# Patient Record
Sex: FEMALE | Race: OTHER | Employment: OTHER | URBAN - METROPOLITAN AREA
[De-identification: names, ages, dates, MRNs, and addresses within clinical notes are randomized per-mention and may not be internally consistent; named-entity substitution may affect disease eponyms.]

---

## 2018-08-24 ENCOUNTER — OFFICE VISIT (OUTPATIENT)
Dept: FAMILY MEDICINE CLINIC | Age: 28
End: 2018-08-24

## 2018-08-24 VITALS
HEIGHT: 64 IN | TEMPERATURE: 98 F | DIASTOLIC BLOOD PRESSURE: 60 MMHG | RESPIRATION RATE: 18 BRPM | HEART RATE: 84 BPM | OXYGEN SATURATION: 98 % | SYSTOLIC BLOOD PRESSURE: 111 MMHG | WEIGHT: 157.4 LBS | BODY MASS INDEX: 26.87 KG/M2

## 2018-08-24 DIAGNOSIS — G89.29 CHRONIC PAIN OF RIGHT KNEE: ICD-10-CM

## 2018-08-24 DIAGNOSIS — Z33.1 INCIDENTAL PREGNANCY: ICD-10-CM

## 2018-08-24 DIAGNOSIS — F32.A DEPRESSION AFFECTING PREGNANCY: ICD-10-CM

## 2018-08-24 DIAGNOSIS — M54.2 NECK PAIN, CHRONIC: ICD-10-CM

## 2018-08-24 DIAGNOSIS — F41.9 ANXIETY: ICD-10-CM

## 2018-08-24 DIAGNOSIS — M54.50 CHRONIC BILATERAL LOW BACK PAIN WITHOUT SCIATICA: Primary | ICD-10-CM

## 2018-08-24 DIAGNOSIS — G89.29 CHRONIC BILATERAL LOW BACK PAIN WITHOUT SCIATICA: Primary | ICD-10-CM

## 2018-08-24 DIAGNOSIS — G40.909 SEIZURE DISORDER (HCC): ICD-10-CM

## 2018-08-24 DIAGNOSIS — O99.340 DEPRESSION AFFECTING PREGNANCY: ICD-10-CM

## 2018-08-24 DIAGNOSIS — M25.561 CHRONIC PAIN OF RIGHT KNEE: ICD-10-CM

## 2018-08-24 DIAGNOSIS — G89.29 NECK PAIN, CHRONIC: ICD-10-CM

## 2018-08-24 RX ORDER — FOLIC ACID 1 MG/1
TABLET ORAL
Refills: 11 | COMMUNITY
Start: 2018-07-31

## 2018-08-24 RX ORDER — LEVETIRACETAM 500 MG/1
TABLET ORAL
Refills: 11 | COMMUNITY
Start: 2018-07-31 | End: 2019-04-04

## 2018-08-24 RX ORDER — ASCORBIC ACID, CHOLECALCIFEROL, .ALPHA.-TOCOPHEROL ACETATE, DL-, PYRIDOXINE, FOLIC ACID, CYANOCOBALAMIN, CALCIUM, FERROUS FUMARATE, MAGNESIUM, DOCONEXENT 85; 200; 10; 25; 1; 12; 140; 27; 45; 300 [IU]/1; [IU]/1; [IU]/1; [IU]/1; MG/1; UG/1; MG/1; MG/1; MG/1; MG/1
CAPSULE, GELATIN COATED ORAL
Refills: 3 | COMMUNITY
Start: 2018-08-05

## 2018-08-24 NOTE — PROGRESS NOTES
Chief Complaint   Patient presents with    New Patient     to [facility] , no pcp in few years. has OB physician , Dr. Vania Odonnell ( 24 weeks pregnant )    Back Pain     c/o lower back pain , with sciatica x 3 months       Reviewed Record in preparation for visit and have obtained necessary documentation. Identified pt with two pt identifiers (Name @ )    Health Maintenance Due   Topic    DTaP/Tdap/Td series (1 - Tdap)    PAP AKA CERVICAL CYTOLOGY     Influenza Age 5 to Adult          1. Have you been to the ER, urgent care clinic since your last visit? Hospitalized since your last visit? No    2. Have you seen or consulted any other health care providers outside of the [address] [place] [address] since your last visit? Include any pap smears or colon screening. Lacey Stafford (ob ) for pregnancy.

## 2018-08-24 NOTE — PATIENT INSTRUCTIONS
[facility], [facility]  [address], [address]   [facility]   [address]   [place], [address]  Phone [phone]  Fax [phone]  [facility] [place]    Dr. Cayden Denton, [address] counselor, substance abuse counselor  Stephanie., [facility], [address]  Phone: [phone]    Cong Bryant [facility]  Clinical Counseling and Consulting of [address] [facility] all ages, problems  [address], [address] [facility], [address] [facility]  Phone: [phone]  Fax: [phone]  Specializes in chronic pain patients  Lanny Maradiaga  [address] [facility]   [address] [facility] 83,8Th [address]  [address]  Phone:  [phone]     Son Camargo at [facility] [address] [facility] 99, [facility], 40 [facility]  Phone: [phone]    [facility]  Jessicalissett Winklernathalie and [address] [facility]   (adolescents, adults, families for all problems)  [address] [facility],5Th Floor, 29 [facility]  [place], [facility]  [facility]  Phone: [phone]    Saul Santos, Ph.D.  [facility] Office  Phone: [phone]  FAX: [phone]  [address] [facility] 23    [facility]  Phone: [phone]  FAX: [phone]  201 [facility], [address]  [address]  [phone]  FAX: [phone] [address] [facility] [address], [address]    [facility]  [phone]  FAX: 800 [facility], 295 [facility], [address] [place], [place] [address]way [address] Tran 40 [facility], [address]  [phone]    [facility] [place] [facility] [place] Zepeda, Ph.D  Tj Rocha [address] [facility], [address] [facility] [address]   [phone]    The [address] [facility] Office  [address] [facility], [facility] [address]  [phone]    [facility] Office  [address] [place] [address] 238 [facility], [address] [place], [address] [facility] 7833, [place], [address]   [phone]    [facility])  [phone]    [facility] [address] Location  [place] [address]    [address] [facility] and [facility] RUSTYWALADAN [place] [address], 2601 [facility], [address]  [phone]    [facility] 96  [facility]  [place] [address], [address]  [phone]               Recovering From Depression: Care Instructions  Your Care Instructions    Taking good care of yourself is important as you recover from depression. In time, your symptoms will fade as your treatment takes hold. Do not give up. Instead, focus your energy on getting better. Your mood will improve. It just takes some time. Focus on things that can help you feel better, such as being with friends and family, eating well, and getting enough rest. But take things slowly. Do not do too much too soon. You will begin to feel better gradually. Follow-up care is a key part of your treatment and safety. Be sure to make and go to all appointments, and call your doctor if you are having problems. It's also a good idea to know your test results and keep a list of the medicines you take. How can you care for yourself at home? Be realistic  · If you have a large task to do, break it up into smaller steps you can handle, and just do what you can. · You may want to put off important decisions until your depression has lifted. If you have plans that will have a major impact on your life, such as marriage, divorce, or a job change, try to wait a bit. Talk it over with friends and loved ones who can help you look at the overall picture first.  · Reaching out to people for help is important. Do not isolate yourself.  Let your family and friends help you. Find someone you can trust and confide in, and talk to that person. · Be patient, and be kind to yourself. Remember that depression is not your fault and is not something you can overcome with willpower alone. Treatment is necessary for depression, just like for any other illness. Feeling better takes time, and your mood will improve little by little. Stay active  · Stay busy and get outside. Take a walk, or try some other light exercise. · Talk with your doctor about an exercise program. Exercise can help with mild depression. · Go to a movie or concert. Take part in a [employer] activity or other social gathering. Go to a [employer] game. · Ask a friend to have dinner with you. Take care of yourself  · Eat a balanced diet with plenty of fresh fruits and vegetables, whole grains, and lean protein. If you have lost your appetite, eat small snacks rather than large meals. · Avoid drinking alcohol or using illegal drugs. Do not take medicines that have not been prescribed for you. They may interfere with medicines you may be taking for depression, or they may make your depression worse. · Take your medicines exactly as they are prescribed. You may start to feel better within 1 to 3 weeks of taking antidepressant medicine. But it can take as many as 6 to 8 weeks to see more improvement. If you have questions or concerns about your medicines, or if you do not notice any improvement by 3 weeks, talk to your doctor. · If you have any side effects from your medicine, tell your doctor. Antidepressants can make you feel tired, dizzy, or nervous. Some people have dry mouth, constipation, headaches, sexual problems, or diarrhea. Many of these side effects are mild and will go away on their own after you have been taking the medicine for a few weeks. Some may last longer. Talk to your doctor if side effects are bothering you too much. You might be able to try a different medicine.   · Get enough sleep. If you have problems sleeping:  ¨ Go to bed at the same time every night, and get up at the same time every morning. ¨ Keep your bedroom dark and quiet. ¨ Do not exercise after 5:00 p.m. ¨ Avoid drinks with caffeine after 5:00 p.m. · Avoid sleeping pills unless they are prescribed by the doctor treating your depression. Sleeping pills may make you groggy during the day, and they may interact with other medicine you are taking. · If you have any other illnesses, such as diabetes, heart disease, or high blood pressure, make sure to continue with your treatment. Tell your doctor about all of the medicines you take, including those with or without a prescription. · Keep the numbers for these national suicide hotlines: 2-529-170-TALK (0-890.791.7945) and 4-143-RMXBFDP (2-762.126.3978). If you or someone you know talks about suicide or feeling hopeless, get help right away. When should you call for help? Call 911 anytime you think you may need emergency care. For example, call if:    · You feel like hurting yourself or someone else.     · Someone you know has depression and is about to attempt or is attempting suicide.   [facility] your doctor now or seek immediate medical care if:    · You hear voices.     · Someone you know has depression and:  ¨ Starts to give away his or her possessions. ¨ Uses illegal drugs or drinks alcohol heavily. ¨ Talks or writes about death, including writing suicide notes or talking about guns, knives, or pills. ¨ Starts to spend a lot of time alone. ¨ Acts very aggressively or suddenly appears calm.    Watch closely for changes in your health, and be sure to contact your doctor if:    · You do not get better as expected. Where can you learn more? Go to http://wendy-socrates.info/. Enter H117 in the search box to learn more about \"Recovering From Depression: Care Instructions. \"  Current as of: December 7, 2017  Content Version: 11.7  © 4047-5788 Healthwise, Incorporated. Care instructions adapted under license by [employer] (which disclaims liability or warranty for this information). If you have questions about a medical condition or this instruction, always ask your healthcare professional. [address] any warranty or liability for your use of this information.

## 2018-10-26 ENCOUNTER — TELEPHONE (OUTPATIENT)
Dept: FAMILY MEDICINE CLINIC | Age: 28
End: 2018-10-26

## 2018-10-26 ENCOUNTER — OFFICE VISIT (OUTPATIENT)
Dept: FAMILY MEDICINE CLINIC | Age: 28
End: 2018-10-26

## 2018-10-26 VITALS
DIASTOLIC BLOOD PRESSURE: 60 MMHG | OXYGEN SATURATION: 98 % | SYSTOLIC BLOOD PRESSURE: 110 MMHG | BODY MASS INDEX: 29.81 KG/M2 | WEIGHT: 174.6 LBS | RESPIRATION RATE: 16 BRPM | TEMPERATURE: 97.9 F | HEART RATE: 86 BPM | HEIGHT: 64 IN

## 2018-10-26 DIAGNOSIS — R09.82 POSTNASAL DRIP: ICD-10-CM

## 2018-10-26 DIAGNOSIS — H04.123 DRY EYES: ICD-10-CM

## 2018-10-26 DIAGNOSIS — G40.909 SEIZURE DISORDER (HCC): ICD-10-CM

## 2018-10-26 DIAGNOSIS — F41.9 ANXIETY: ICD-10-CM

## 2018-10-26 DIAGNOSIS — G89.29 CHRONIC BILATERAL LOW BACK PAIN WITHOUT SCIATICA: ICD-10-CM

## 2018-10-26 DIAGNOSIS — M54.50 CHRONIC BILATERAL LOW BACK PAIN WITHOUT SCIATICA: ICD-10-CM

## 2018-10-26 DIAGNOSIS — L30.9 NIPPLE DERMATITIS: Primary | ICD-10-CM

## 2018-10-26 RX ORDER — LORATADINE 10 MG/1
10 TABLET ORAL
Qty: 30 TAB | Refills: 6 | Status: SHIPPED | OUTPATIENT
Start: 2018-10-26

## 2018-10-26 RX ORDER — POLYVINYL ALCOHOL 14 MG/ML
1 SOLUTION/ DROPS OPHTHALMIC
Qty: 15 ML | Refills: 3 | Status: SHIPPED | OUTPATIENT
Start: 2018-10-26

## 2018-10-26 NOTE — TELEPHONE ENCOUNTER
white pet-mineral oil-lanolin (AKWA TEARS) ophthalmic ointment [[id number]]     Pharmacy is calling to let Dr Concha Carcamo know that they don't have the medication above, if she can change it to something else.   Best call back : [phone]

## 2018-10-26 NOTE — PROGRESS NOTES
Chief Complaint   Patient presents with    Back Pain     follow up back and neck pain ,states went to therapy and some better    Other     feels like something in throat, has to clear throat alot       Reviewed Record in preparation for visit and have obtained necessary documentation. Identified pt with two pt identifiers (Name @ )    Health Maintenance Due   Topic    DTaP/Tdap/Td series (1 - Tdap)    PAP AKA CERVICAL CYTOLOGY     Influenza Age 5 to Adult          1. Have you been to the ER, urgent care clinic since your last visit? Hospitalized since your last visit? No    2. Have you seen or consulted any other health care providers outside of the [address] [place] [address] since your last visit? Include any pap smears or colon screening. Saw ob/gyn this past Tuesday .

## 2018-10-26 NOTE — PATIENT INSTRUCTIONS
Use aquaphor or vaseline on nipples and hands after bathing to lock in the moisture  If the vaseline and aquaphor don't help you can use hydrocortisone cream on your nipples (but not when nursing)         Dermatitis: Care Instructions  Your Care Instructions  Dermatitis is the general name used for any rash or inflammation of the skin. Different kinds of dermatitis cause different kinds of rashes. Common causes of a rash include new medicines, plants (such as poison oak or poison ivy), heat, and stress. Certain illnesses can also cause a rash. An allergic reaction to something that touches your skin, such as latex, nickel, or poison ivy, is called contact dermatitis. Contact dermatitis may also be caused by something that irritates the skin, such as bleach, a chemical, or soap. These types of rashes cannot be spread from person to person. How long your rash will last depends on what caused it. Rashes may last a few days or months. Follow-up care is a key part of your treatment and safety. Be sure to make and go to all appointments, and call your doctor if you are having problems. It's also a good idea to know your test results and keep a list of the medicines you take. How can you care for yourself at home? · Do not scratch the rash. Cut your nails short, and file them smooth. Or wear gloves if this helps keep you from scratching. · Wash the area with water only. Pat dry. · Put cold, wet cloths on the rash to reduce itching. · Keep cool, and stay out of the sun. · Leave the rash open to the air as much as possible. · If the rash itches, use hydrocortisone cream. Follow the directions on the label. Calamine lotion may help for plant rashes. · Take an over-the-counter antihistamine, such as diphenhydramine (Benadryl) or loratadine (Claritin), to help calm the itching. Read and follow all instructions on the label. · If your doctor prescribed a cream, use it as directed.  If your doctor prescribed medicine, take it exactly as directed. When should you call for help? Call your doctor now or seek immediate medical care if:    · You have symptoms of infection, such as:  ? Increased pain, swelling, warmth, or redness. ? Red streaks leading from the area. ? Pus draining from the area. ? A fever.     · You have joint pain along with the rash.    Watch closely for changes in your health, and be sure to contact your doctor if:    · Your rash is changing or getting worse.     · You are not getting better as expected. Where can you learn more? Go to http://wendy-socrates.info/. Enter [phone] in the search box to learn more about \"Dermatitis: Care Instructions. \"  Current as of: April 18, 2018  Content Version: 11.8  © 3063-3189 Healthwise, Incorporated. Care instructions adapted under license by [employer] (which disclaims liability or warranty for this information). If you have questions about a medical condition or this instruction, always ask your healthcare professional. [place] [address] any warranty or liability for your use of this information.

## 2018-10-26 NOTE — PROGRESS NOTES
Daniel Awad [facility]  [address] [place], [address]  [phone]             Date of visit: 10/26/2018   Subjective:      History obtained from:  the patient. Shira Floyd is a 33 y.o. female who presents today for f/u chronic problems and throat congestion  Even before pregnant always had phlegm in her throat  Lately getting worse  Always clearing throat  Doesn't feel sick  No runny nose  Eyes are dry, feel irritated at night, not itchy  Sometimes has acid reflux mainly dependent on what she eats, about 3x per week  Pregnant due 12/16/18  Not getting enough sleep, baby moves a lot    Still on keppra, planning to breastfeed  Will still stay on keppra long-term    Right nipple looking different from left  Has some moles growing since she has been pregnant  Her nipples are red and dry feeling    For her belly using tummy butter    Has rosacea on face and is careful    Got flu shot and tdap 10/9      Did 9 sessions PT and reall helped her upper/lower back pain  Continues to do the exercises at home when having pain    Not having painful contractions  No bleeding  No loss of fluid  Feeling baby move    Her mood is much better after doing 2 counseling sessions    Went to [place] last week, flew, got swollen in legs, but gone now  Patient Active Problem List    Diagnosis Date Noted    Seizure disorder ([facility] [address] 08/24/2018    Neck pain, chronic 08/24/2018    Chronic pain of right knee 08/24/2018    Chronic bilateral low back pain without sciatica 08/24/2018    Depression affecting pregnancy 08/24/2018    Anxiety 08/24/2018     Current Outpatient Medications   Medication Sig Dispense Refill    loratadine (CLARITIN) 10 mg tablet Take 1 Tab by mouth daily as needed for Allergies. 30 Tab 6    white pet-mineral oil-lanolin (AKWA TEARS) ophthalmic ointment Administer  to both eyes nightly.  3.5 g 6    PNV-DHA 27 mg iron-1 mg -300 mg cap TAKE 1 TABLET BY MOUTH DAILY  3    folic acid (FOLVITE) 1 mg tablet TAKE 1 TABLET BY MOUTH EVERY DAY  11    levETIRAcetam (KEPPRA) 500 mg tablet TAKE 1 TABLET BY MOUTH TWICE A DAY  11    ibuprofen (MOTRIN) 600 mg tablet Take 1 Tab by mouth every six (6) hours as needed for Pain. 30 Tab 0     No Known Allergies  Past Medical History:   Diagnosis Date    Seizures ([address]      Past Surgical History:   Procedure Laterality Date    HX ORTHOPAEDIC       Family History   Problem Relation Age of Onset    Thyroid Disease Mother     Heart Disease Father     Hypertension Father     Thyroid Disease Sister      Social History     Tobacco Use    Smoking status: Never Smoker    Smokeless tobacco: Never Used   Substance Use Topics    Alcohol use: No      Social History     Social History Narrative    Originally from 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      General: stated age, well nourished, and in NAD  Eyes: no redness or drainage, appear normal, PERRL  Neck: supple, symmetrical, trachea midline, no adenopathy and thyroid: not enlarged, symmetric, no tenderness/mass/nodules  Ears: TMs clear bilaterally, canals patent without inflammation  Nose: clear drainage, turbinates a little pale  Throat: clear, no tonsillar exudate or erthema  Mouth: no lesions noted  Lungs:  clear to auscultation w/o rales, rhonchi, wheezes w/normal effort and no use of accessory muscles of respiration   Heart: regular rate and rhythm, S1, S2 normal, no murmur, click, rub or gallop  Abd: gravid  Lymph: no cervical adenopathy appreciated  Ext: no edema  Skin:  bilateral aerolas erythematous and a bit scaly, several small 2-5mm slightly raised brown uniform nevi on both nipples      Assessment/Plan:       ICD-10-CM ICD-9-CM    1. Nipple dermatitis L30.9 692.9    2. Postnasal drip R09.82 784.91    3. Seizure disorder ([facility] [address] G40.909 345.90    4. Chronic bilateral low back pain without sciatica M54.5 724.2     G89.29 338.29    5. Anxiety F41.9 300.00    6. Dry eyes H04. 123 375.15         Orders Placed This Encounter    loratadine (CLARITIN) 10 mg tablet    white pet-mineral oil-lanolin (AKWA TEARS) ophthalmic ointment       Advised vaseline or aquaphor for nipples, hydrocortisone if that doesn't work (until she is nursing)  Discussed breastfeeding, taking class, also lamaze  Try claritin for PND, update me in a week  Could be related to her reflux as well, but she doesn't feel like that is frequent  I am relieved her mood is doing better, tolerating the keppra better  The 2 counseling sessions helped  Her back pain is much better after a round of PT and will do the exercises at home when needed  Ointment for dry eyes that happen at night. Discussed the diagnosis and plan and she expressed understanding. Follow-up Disposition:  Return if symptoms worsen or fail to improve.     Cristino Solomon MD

## 2019-04-04 ENCOUNTER — HOSPITAL ENCOUNTER (EMERGENCY)
Age: 29
Discharge: HOME | End: 2019-04-04
Attending: EMERGENCY MEDICINE
Payer: SELF-PAY

## 2019-04-04 VITALS
BODY MASS INDEX: 29.92 KG/M2 | TEMPERATURE: 98.8 F | HEIGHT: 64 IN | SYSTOLIC BLOOD PRESSURE: 110 MMHG | RESPIRATION RATE: 11 BRPM | HEART RATE: 78 BPM | WEIGHT: 175.27 LBS | DIASTOLIC BLOOD PRESSURE: 75 MMHG | OXYGEN SATURATION: 97 %

## 2019-04-04 DIAGNOSIS — R56.9 SEIZURE (HCC): Primary | ICD-10-CM

## 2019-04-04 LAB
ALBUMIN SERPL-MCNC: 3.9 G/DL (ref 3.5–5)
ALBUMIN/GLOB SERPL: 1.1 {RATIO} (ref 1.1–2.2)
ALP SERPL-CCNC: 96 U/L (ref 45–117)
ALT SERPL-CCNC: 99 U/L (ref 12–78)
ANION GAP SERPL CALC-SCNC: 14 MMOL/L (ref 5–15)
APPEARANCE UR: CLEAR
AST SERPL-CCNC: 35 U/L (ref 15–37)
ATRIAL RATE: 99 BPM
BACTERIA URNS QL MICRO: NEGATIVE /HPF
BASOPHILS # BLD: 0 K/UL (ref 0–0.1)
BASOPHILS NFR BLD: 0 % (ref 0–1)
BILIRUB SERPL-MCNC: 0.3 MG/DL (ref 0.2–1)
BILIRUB UR QL: NEGATIVE
BUN SERPL-MCNC: 18 MG/DL (ref 6–20)
BUN/CREAT SERPL: 16 (ref 12–20)
CALCIUM SERPL-MCNC: 9.1 MG/DL (ref 8.5–10.1)
CALCULATED P AXIS, ECG09: 59 DEGREES
CALCULATED R AXIS, ECG10: 72 DEGREES
CALCULATED T AXIS, ECG11: 57 DEGREES
CHLORIDE SERPL-SCNC: 102 MMOL/L (ref 97–108)
CO2 SERPL-SCNC: 23 MMOL/L (ref 21–32)
COLOR UR: ABNORMAL
COMMENT, HOLDF: NORMAL
CREAT SERPL-MCNC: 1.12 MG/DL (ref 0.55–1.02)
DIAGNOSIS, 93000: NORMAL
DIFFERENTIAL METHOD BLD: NORMAL
EOSINOPHIL # BLD: 0 K/UL (ref 0–0.4)
EOSINOPHIL NFR BLD: 1 % (ref 0–7)
EPITH CASTS URNS QL MICRO: ABNORMAL /LPF
ERYTHROCYTE [DISTWIDTH] IN BLOOD BY AUTOMATED COUNT: 12.8 % (ref 11.5–14.5)
GLOBULIN SER CALC-MCNC: 3.7 G/DL (ref 2–4)
GLUCOSE SERPL-MCNC: 75 MG/DL (ref 65–100)
GLUCOSE UR STRIP.AUTO-MCNC: NEGATIVE MG/DL
HCT VFR BLD AUTO: 40.4 % (ref 35–47)
HGB BLD-MCNC: 13.4 G/DL (ref 11.5–16)
HGB UR QL STRIP: ABNORMAL
IMM GRANULOCYTES # BLD AUTO: 0 K/UL (ref 0–0.04)
IMM GRANULOCYTES NFR BLD AUTO: 0 % (ref 0–0.5)
KETONES UR QL STRIP.AUTO: NEGATIVE MG/DL
LEUKOCYTE ESTERASE UR QL STRIP.AUTO: ABNORMAL
LYMPHOCYTES # BLD: 2.4 K/UL (ref 0.8–3.5)
LYMPHOCYTES NFR BLD: 47 % (ref 12–49)
MAGNESIUM SERPL-MCNC: 2.1 MG/DL (ref 1.6–2.4)
MCH RBC QN AUTO: 29.6 PG (ref 26–34)
MCHC RBC AUTO-ENTMCNC: 33.2 G/DL (ref 30–36.5)
MCV RBC AUTO: 89.2 FL (ref 80–99)
MONOCYTES # BLD: 0.4 K/UL (ref 0–1)
MONOCYTES NFR BLD: 7 % (ref 5–13)
NEUTS SEG # BLD: 2.3 K/UL (ref 1.8–8)
NEUTS SEG NFR BLD: 45 % (ref 32–75)
NITRITE UR QL STRIP.AUTO: NEGATIVE
NRBC # BLD: 0 K/UL (ref 0–0.01)
NRBC BLD-RTO: 0 PER 100 WBC
P-R INTERVAL, ECG05: 154 MS
PH UR STRIP: 6 [PH] (ref 5–8)
PLATELET # BLD AUTO: 238 K/UL (ref 150–400)
PMV BLD AUTO: 10.7 FL (ref 8.9–12.9)
POTASSIUM SERPL-SCNC: 3.8 MMOL/L (ref 3.5–5.1)
PROT SERPL-MCNC: 7.6 G/DL (ref 6.4–8.2)
PROT UR STRIP-MCNC: 100 MG/DL
Q-T INTERVAL, ECG07: 366 MS
QRS DURATION, ECG06: 88 MS
QTC CALCULATION (BEZET), ECG08: 469 MS
RBC # BLD AUTO: 4.53 M/UL (ref 3.8–5.2)
RBC #/AREA URNS HPF: >100 /HPF (ref 0–5)
SAMPLES BEING HELD,HOLD: NORMAL
SODIUM SERPL-SCNC: 139 MMOL/L (ref 136–145)
SP GR UR REFRACTOMETRY: 1.02 (ref 1–1.03)
UROBILINOGEN UR QL STRIP.AUTO: 0.2 EU/DL (ref 0.2–1)
VENTRICULAR RATE, ECG03: 99 BPM
WBC # BLD AUTO: 5.3 K/UL (ref 3.6–11)
WBC URNS QL MICRO: ABNORMAL /HPF (ref 0–4)

## 2019-04-04 PROCEDURE — 81001 URINALYSIS AUTO W/SCOPE: CPT

## 2019-04-04 PROCEDURE — 85025 COMPLETE CBC W/AUTO DIFF WBC: CPT

## 2019-04-04 PROCEDURE — 93005 ELECTROCARDIOGRAM TRACING: CPT

## 2019-04-04 PROCEDURE — 99284 EMERGENCY DEPT VISIT MOD MDM: CPT

## 2019-04-04 PROCEDURE — 80053 COMPREHEN METABOLIC PANEL: CPT

## 2019-04-04 PROCEDURE — 36415 COLL VENOUS BLD VENIPUNCTURE: CPT

## 2019-04-04 PROCEDURE — 83735 ASSAY OF MAGNESIUM: CPT

## 2019-04-04 PROCEDURE — 74011250637 HC RX REV CODE- 250/637: Performed by: EMERGENCY MEDICINE

## 2019-04-04 PROCEDURE — 96361 HYDRATE IV INFUSION ADD-ON: CPT

## 2019-04-04 PROCEDURE — 96374 THER/PROPH/DIAG INJ IV PUSH: CPT

## 2019-04-04 PROCEDURE — 74011250636 HC RX REV CODE- 250/636: Performed by: EMERGENCY MEDICINE

## 2019-04-04 RX ORDER — ONDANSETRON 2 MG/ML
4 INJECTION INTRAMUSCULAR; INTRAVENOUS
Status: COMPLETED | OUTPATIENT
Start: 2019-04-04 | End: 2019-04-04

## 2019-04-04 RX ORDER — LAMOTRIGINE 100 MG/1
50 TABLET ORAL
Status: COMPLETED | OUTPATIENT
Start: 2019-04-04 | End: 2019-04-04

## 2019-04-04 RX ORDER — LAMOTRIGINE 100 MG/1
TABLET ORAL
Qty: 30 TAB | Refills: 0 | Status: SHIPPED | OUTPATIENT
Start: 2019-04-04 | End: 2019-04-04

## 2019-04-04 RX ORDER — LAMOTRIGINE 100 MG/1
100 TABLET ORAL 2 TIMES DAILY
COMMUNITY
End: 2019-04-04

## 2019-04-04 RX ORDER — LAMOTRIGINE 100 MG/1
TABLET ORAL
Qty: 30 TAB | Refills: 0 | Status: SHIPPED | OUTPATIENT
Start: 2019-04-04

## 2019-04-04 RX ADMIN — SODIUM CHLORIDE 1000 ML: 900 INJECTION, SOLUTION INTRAVENOUS at 01:55

## 2019-04-04 RX ADMIN — ONDANSETRON 4 MG: 2 INJECTION INTRAMUSCULAR; INTRAVENOUS at 01:55

## 2019-04-04 RX ADMIN — LAMOTRIGINE 50 MG: 100 TABLET ORAL at 02:51

## 2019-04-04 NOTE — ED PROVIDER NOTES
65-year-old female presenting to the ER status post seizure. Past medical history of seizure disorder, and actually 3 months postpartum. This evening the patient was trying to sleep when she felt \"jumpy\" then awoke into her  stating that she had a seizure. Patient's  states that it was tonoclonic seizure lasting approximately 3 minutes with a postictal period consistent of fusion but able to speak. The patient bit her tongue. No urinary incontinence. Before patient's recent pregnancy she was on valproic acid this was changed to Her urine pregnancy. 4 weeks ago patient's Keppra was weaned down and was started on Lamictal. Currently not taking any Are up is taking Lamictal 100 mg b.i.d. Patient is followed by neurology Dr. Ijeoma Caruso Patient reports having recent URI-like symptoms. No fevers. No UTI-like symptoms Patient reports decreased amount of sleep as a result of having an infant Current having her menstrual cycle. No drug use Patient reports being compliant with medications Past Medical History:  
Diagnosis Date  Seizures ([facility] [address] Past Surgical History:  
Procedure Laterality Date  HX ORTHOPAEDIC Family History:  
Problem Relation Age of Onset  Thyroid Disease Mother  Heart Disease Father  Hypertension Father  Thyroid Disease Sister Social History Socioeconomic History  Marital status: SINGLE Spouse name: Not on file  Number of children: Not on file  Years of education: Not on file  Highest education level: Not on file Occupational History  Not on file Social Needs  Financial resource strain: Not on file  Food insecurity:  
  Worry: Not on file Inability: Not on file  Transportation needs:  
  Medical: Not on file Non-medical: Not on file Tobacco Use  Smoking status: Never Smoker  Smokeless tobacco: Never Used Substance and Sexual Activity  Alcohol use:  Yes  
 Comment: occasionally  Drug use: No  
 Sexual activity: Yes  
  Partners: Male Birth control/protection: None Lifestyle  Physical activity:  
  Days per week: Not on file Minutes per session: Not on file  Stress: Not on file Relationships  Social connections:  
  Talks on phone: Not on file Gets together: Not on file Attends Congregational service: Not on file Active member of club or organization: Not on file Attends meetings of clubs or organizations: Not on file Relationship status: Not on file  Intimate partner violence:  
  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 Review of Systems Constitutional: Positive for activity change (decreased sleep) and fatigue. Negative for chills and fever. HENT: Positive for congestion. Negative for sore throat. Eyes: Negative for photophobia. Respiratory: Negative for shortness of breath. Cardiovascular: Negative for chest pain and leg swelling. Gastrointestinal: Negative for abdominal pain. Genitourinary: Negative for dysuria, flank pain and frequency. Musculoskeletal: Negative for back pain. Neurological: Positive for seizures. Negative for weakness, numbness and headaches. Vitals:  
 04/04/19 0100 04/04/19 0145 BP: (!) 143/108 110/75 Pulse: (!) 110 78 Resp: 21 11 Temp: 98.8 °F (37.1 °C) SpO2: 98% 97% Weight: 79.5 kg (175 lb 4.3 oz) Height: 5' 4\" (1.626 m) Physical Exam  
Constitutional: She is oriented to person, place, and time. She appears well-developed and well-nourished. No distress. HENT:  
Head: Normocephalic. Mouth/Throat: Oropharynx is clear and moist.  
Tongue laceration Eyes: Pupils are equal, round, and reactive to light. Conjunctivae and EOM are normal.  
Neck: Normal range of motion. Neck supple. Cardiovascular: Regular rhythm. Tachycardia present. Pulmonary/Chest: Effort normal and breath sounds normal. No respiratory distress. Abdominal: Soft. Bowel sounds are normal. There is no tenderness. Musculoskeletal: Normal range of motion. She exhibits no edema. Neurological: She is alert and oriented to person, place, and time. She has normal strength. No cranial nerve deficit or sensory deficit. Skin: Skin is warm. Capillary refill takes less than 2 seconds. No rash noted. MDM Number of Diagnoses or Management Options Diagnosis management comments: 70-year-old female history seizure disorder 4 weeks ago at [facility] titrated down and started on Lamictal. Decreased sleep and recent URI. Last seizure one year ago. Had a tonic-clonic seizure followed by postictal period now back at baseline. Patient is postpartum 3 months outside the window for eclampsia. Amount and/or Complexity of Data Reviewed Clinical lab tests: reviewed Tests in the medicine section of CPT®: reviewed ED Course as of Apr 04 0256 Thu Apr 04, 2019  
[id number] Paged pt's neurology several times without answer. Paged BS neurology oncall  
 [ZD] [address]: Vanessa Haines: recommending increasing Lamictal 100mg AM and 150mg PM 
F/U with neurologist  
 [ZD] 1970 Discussed with pt the change in Lamictal dosing. [ZD] ED Course User Index [ZD] Jeffrey Orta MD  
 
 
Procedures Recent Results (from the past 24 hour(s)) CBC WITH AUTOMATED DIFF Collection Time: 04/04/19 12:57 AM  
Result Value Ref Range WBC 5.3 3.6 - 11.0 K/uL  
 RBC 4.53 3.80 - 5.20 M/uL  
 HGB 13.4 11.5 - 16.0 g/dL HCT 40.4 35.0 - 47.0 % MCV 89.2 80.0 - 99.0 FL  
 MCH 29.6 26.0 - 34.0 PG  
 MCHC 33.2 30.0 - 36.5 g/dL RDW 12.8 11.5 - 14.5 % PLATELET 626 263 - 719 K/uL MPV 10.7 8.9 - 12.9 FL  
 NRBC 0.0 0  WBC ABSOLUTE NRBC 0.00 0.00 - 0.01 K/uL NEUTROPHILS 45 32 - 75 % LYMPHOCYTES 47 12 - 49 % MONOCYTES 7 5 - 13 % EOSINOPHILS 1 0 - 7 % BASOPHILS 0 0 - 1 % IMMATURE GRANULOCYTES 0 0.0 - 0.5 % ABS. NEUTROPHILS 2.3 1.8 - 8.0 K/UL  
 ABS. LYMPHOCYTES 2.4 0.8 - 3.5 K/UL  
 ABS. MONOCYTES 0.4 0.0 - 1.0 K/UL  
 ABS. EOSINOPHILS 0.0 0.0 - 0.4 K/UL  
 ABS. BASOPHILS 0.0 0.0 - 0.1 K/UL  
 ABS. IMM. GRANS. 0.0 0.00 - 0.04 K/UL  
 DF AUTOMATED METABOLIC PANEL, COMPREHENSIVE Collection Time: 04/04/19 12:57 AM  
Result Value Ref Range Sodium 139 136 - 145 mmol/L Potassium 3.8 3.5 - 5.1 mmol/L Chloride 102 97 - 108 mmol/L  
 CO2 23 21 - 32 mmol/L Anion gap 14 5 - 15 mmol/L Glucose 75 65 - 100 mg/dL BUN 18 6 - 20 MG/DL Creatinine 1.12 (H) 0.55 - 1.02 MG/DL  
 BUN/Creatinine ratio 16 12 - 20 GFR est AA >60 >60 ml/min/1.73m2 GFR est non-AA 58 (L) >60 ml/min/1.73m2 Calcium 9.1 8.5 - 10.1 MG/DL Bilirubin, total 0.3 0.2 - 1.0 MG/DL  
 ALT (SGPT) 99 (H) 12 - 78 U/L  
 AST (SGOT) 35 15 - 37 U/L Alk. phosphatase 96 45 - 117 U/L Protein, total 7.6 6.4 - 8.2 g/dL Albumin 3.9 3.5 - 5.0 g/dL Globulin 3.7 2.0 - 4.0 g/dL A-G Ratio 1.1 1.1 - 2.2 MAGNESIUM Collection Time: 04/04/19 12:57 AM  
Result Value Ref Range Magnesium 2.1 1.6 - 2.4 mg/dL EKG, 12 LEAD, INITIAL Collection Time: 04/04/19  1:00 AM  
Result Value Ref Range Ventricular Rate 99 BPM  
 Atrial Rate 99 BPM  
 P-R Interval 154 ms QRS Duration 88 ms Q-T Interval 366 ms  
 QTC Calculation (Bezet) 469 ms Calculated P Axis 59 degrees Calculated R Axis 72 degrees Calculated T Axis 57 degrees Diagnosis Normal sinus rhythm Normal ECG No previous ECGs available SAMPLES BEING HELD Collection Time: 04/04/19  1:04 AM  
Result Value Ref Range SAMPLES BEING HELD 1 RED 1 BLUE   
 COMMENT Add-on orders for these samples will be processed based on acceptable specimen integrity and analyte stability, which may vary by analyte. URINALYSIS W/ RFLX MICROSCOPIC Collection Time: 04/04/19  1:54 AM  
Result Value Ref Range Color RED Appearance CLEAR CLEAR Specific gravity 1.025 1.003 - 1.030    
 pH (UA) 6.0 5.0 - 8.0 Protein 100 (A) NEG mg/dL Glucose NEGATIVE  NEG mg/dL Ketone NEGATIVE  NEG mg/dL Bilirubin NEGATIVE  NEG Blood LARGE (A) NEG Urobilinogen 0.2 0.2 - 1.0 EU/dL Nitrites NEGATIVE  NEG Leukocyte Esterase SMALL (A) NEG URINE MICROSCOPIC ONLY Collection Time: 04/04/19  1:54 AM  
Result Value Ref Range WBC 10-20 0 - 4 /hpf  
 RBC >100 (H) 0 - 5 /hpf Epithelial cells FEW FEW /lpf Bacteria NEGATIVE  NEG /hpf No results found. 1:25 AM 
EKG: NSR. Rate 99. Normal intervals, normal axis, no ST-elevations or depressions. No signs of ischemia.

## 2019-04-04 NOTE — ED TRIAGE NOTES
Pt arrived via EMS. EMS reports patient has a history of seizures and recently had her daughter 3 months ago and was taken off of her Keppra this week and started on lamotrigine 4 weeks ago. Pt reports her Neurologist is Rd Camarena MD. Pt reports her last seizure was 1 year ago. EMS states that patient experience approx. 3 minute tonic clonic seizure tonight prior to coming to ED.

## 2019-04-04 NOTE — DISCHARGE INSTRUCTIONS
Patient Education        Seizure: Care Instructions  Your Care Instructions    Seizures are caused by abnormal patterns of electrical signals in the brain. They are different for each person. Seizures can affect movement, speech, vision, or awareness. Some people have only slight shaking of a hand and do not pass out. Other people may pass out and have violent shaking of the whole body. Some people appear to stare into space. They are awake, but they can't respond normally. Later, they may not remember what happened. You may need tests to identify the type and cause of the seizures. A seizure may occur only once, or you may have them more than one time. Taking medicines as directed and following up with your doctor may help keep you from having more seizures. The doctor has checked you carefully, but problems can develop later. If you notice any problems or new symptoms, get medical treatment right away. Follow-up care is a key part of your treatment and safety. Be sure to make and go to all appointments, and call your doctor if you are having problems. It's also a good idea to know your test results and keep a list of the medicines you take. How can you care for yourself at home? · Be safe with medicines. Take your medicines exactly as prescribed. Call your doctor if you think you are having a problem with your medicine. · Do not do any activity that could be dangerous to you or others until your doctor says it is safe to do so. For example, do not drive a car, operate machinery, swim, or climb ladders. · Be sure that anyone treating you for any health problem knows that you have had a seizure and what medicines you are taking for it. · Identify and avoid things that may make you more likely to have a seizure. These may include lack of sleep, alcohol or drug use, stress, or not eating. · Make sure you go to your follow-up appointment. When should you call for help?   Call 911 anytime you think you may need emergency care. For example, call if:    · You have another seizure.     · You have more than one seizure in 24 hours.     · You have new symptoms, such as trouble walking, speaking, or thinking clearly.    Call your doctor now or seek immediate medical care if:    · You are not acting normally.    Watch closely for changes in your health, and be sure to contact your doctor if you have any problems. Where can you learn more? Go to http://wendy-socrates.info/. Enter T797 in the search box to learn more about \"Seizure: Care Instructions. \"  Current as of: Claudia 3, 2018  Content Version: 11.9  © 4287-6969 [employer]. Care instructions adapted under license by [employer] (which disclaims liability or warranty for this information). If you have questions about a medical condition or this instruction, always ask your healthcare professional. [address] any warranty or liability for your use of this information.

## 2024-05-22 NOTE — MR AVS SNAPSHOT
[address] [place] [address] 
[phone] Patient: Blanca Landeros MRN: [id number] :1990 Visit Information Date & Time Provider Department Dept. Phone Encounter #  
 2018  2:00 PM Marizol Kaur, [facility] [phone] [id number] Upcoming Health Maintenance Date Due DTaP/Tdap/Td series (1 - Tdap) 10/25/2010 PAP AKA CERVICAL CYTOLOGY 10/25/2010 Influenza Age 5 to Adult 10/1/2018* *Topic was postponed. The date shown is not the original due date. Allergies as of 2018  Review Complete On: 2018 By: Mitch Poon LPN Not on File Current Immunizations  Reviewed on 2018 No immunizations on file. Not reviewed this visit You Were Diagnosed With   
  
 Codes Comments Chronic bilateral low back pain without sciatica    -  Primary ICD-10-CM: M54.5, G89.29 ICD-9-CM: 724.2, 338.29 Chronic pain of right knee     ICD-10-CM: M25.561, G89.29 ICD-9-CM: 719.46, 338.29 Neck pain, chronic     ICD-10-CM: M54.2, G89.29 ICD-9-CM: 723.1, 338.29 Seizure disorder ([facility] [address]     ICD-10-CM: G40.909 ICD-9-CM: 345.90 Depression affecting pregnancy     ICD-10-CM: O99.340, F32.9 ICD-9-CM: 648.40, 311 Anxiety     ICD-10-CM: F41.9 ICD-9-CM: 300.00 Incidental pregnancy     ICD-10-CM: Z33.1 ICD-9-CM: V22.2 Vitals BP Pulse Temp Resp Height(growth percentile) Weight(growth percentile) 111/60 (BP 1 Location: Left arm, BP Patient Position: Sitting) 84 98 °F (36.7 °C) (Oral) 18 5' 4\" (1.626 m) 157 lb 6.4 oz (71.4 kg) SpO2 BMI OB Status Smoking Status 98% 27.02 kg/m2 Pregnant Never Smoker Vitals History BMI and BSA Data Body Mass Index Body Surface Area  
 27.02 kg/m 2 1.8 m 2 Preferred Pharmacy Pharmacy Name Phone  [facility] [address] [phone] Your Updated Medication List  
  
   
This list is accurate as of 8/24/18  3:01 PM.  Always use your most recent med list.  
  
  
  
  
 folic acid 1 mg tablet Commonly known as:  FOLVITE  
TAKE 1 TABLET BY MOUTH EVERY DAY  
  
 ibuprofen 600 mg tablet Commonly known as:  MOTRIN Take 1 Tab by mouth every six (6) hours as needed for Pain.  
  
 levETIRAcetam 500 mg tablet Commonly known as:  KEPPRA TAKE 1 TABLET BY MOUTH TWICE A DAY  
  
 PNV-DHA 27 mg iron-1 mg -300 mg Cap Generic drug:  PNV Combo No.47-Iron-FA #1-DHA TAKE 1 TABLET BY MOUTH DAILY We Performed the Following REFERRAL TO PHYSICAL THERAPY [FTD15 Custom] Comments:  
 [facility] Offering [facility] Glenn White, [facility], [address] [phone] Referral Information Referral ID Referred By Referred To  
  
 [id number] Jacob Wells Not Available Visits Status Start Date End Date 1 New Request 8/24/18 8/24/19 If your referral has a status of pending review or denied, additional information will be sent to support the outcome of this decision. Patient Instructions [facility], [facility] [address] [facility], [address] [place] [address] [facility]  
[address] [place] [address] Phone [phone] Fax [phone] [facility] Karen Alaniz, [facility] counselor, substance abuse counselor Stephanie[address] [facility], [address] Phone: [phone] Nehemias Kasper LCSW [facility] [place] Sees all ages, problems [address], [address] [place], [address] [facility] Phone: [phone] Fax: [phone] Reanna [place] Specializes in chronic pain patients Jessy Desouza [address] [facility] [address] [place], [address] [place] [address] Phone:  [phone] Danae Odom at [employer] [facility] [address], [address] [facility], [address] [facility] [address] Phone: [phone] [facility] Elise Felder and [facility] (adolescents, adults, families for all problems) 1000 [facility],[address]Th Floor, [address] [facility] 
[employer] Phone: [phone] Grant Rteana, Ph.D. 
350 [facility] Phone: [phone] FAX: [phone] 
9 [place] [facility], [facility] 23 [facility] Phone: [phone] FAX: [phone] 
201 [facility], [address] [facility] 7 [address] [employer] [phone] FAX: [phone] [address] [facility] Marleny Morrissssherrie 33 [facility] [phone] FAX: [phone] [facility], [address] [facility], [facility] [address] [facility], [address] [facility] [address] [place] [address] 
[phone] [employer] Agustín Moya, Ph.D 
[address] [facility] [address] [facility], [address] [facility] [address]  
[phone] The [facility] [address] 1099 [facility], [address] 
[phone] [facility] Office [address] [facility], [address] 
[phone] [place], [address] [place] [address], [facility], [address] [phone] [facility] [place] ([place] near [facility]) [phone] [address] Location 1230 [facility] [address] 47 [facility] [address], [address] 
[phone] [facility] Offering Aqu[facility] Physical Therapy Glenn White, [facility], [address] [phone] Recovering From Depression: Care Instructions Your Care Instructions Taking good care of yourself is important as you recover from depression. In time, your symptoms will fade as your treatment takes hold. Do not give up. Instead, focus your energy on getting better. Your mood will improve. It just takes some time. Focus on things that can help you feel better, such as being with friends and family, eating well, and getting enough rest. But take things slowly. Do not do too much too soon. You will begin to feel better gradually. Follow-up care is a key part of your treatment and safety. Be sure to make and go to all appointments, and call your doctor if you are having problems. It's also a good idea to know your test results and keep a list of the medicines you take. How can you care for yourself at home? Be realistic · If you have a large task to do, break it up into smaller steps you can handle, and just do what you can. · You may want to put off important decisions until your depression has lifted. If you have plans that will have a major impact on your life, such as marriage, divorce, or a job change, try to wait a bit. Talk it over with friends and loved ones who can help you look at the overall picture first. 
· Reaching out to people for help is important. Do not isolate yourself. Let your family and friends help you. Find someone you can trust and confide in, and talk to that person. · Be patient, and be kind to yourself. Remember that depression is not your fault and is not something you can overcome with willpower alone. Treatment is necessary for depression, just like for any other illness. Feeling better takes time, and your mood will improve little by little. Stay active · Stay busy and get outside. Take a walk, or try some other light exercise. · Talk with your doctor about an exercise program. Exercise can help with mild depression. · Go to a movie or concert. Take part in a Zoroastrian activity or other social gathering. Go to a ball game. · Ask a friend to have dinner with you. Take care of yourself · Eat a balanced diet with plenty of fresh fruits and vegetables, whole grains, and lean protein. If you have lost your appetite, eat small snacks rather than large meals. · Avoid drinking alcohol or using illegal drugs. Do not take medicines that have not been prescribed for you. They may interfere with medicines you may be taking for depression, or they may make your depression worse. · Take your medicines exactly as they are prescribed. You may start to feel better within 1 to 3 weeks of taking antidepressant medicine. But it can take as many as 6 to 8 weeks to see more improvement. If you have questions or concerns about your medicines, or if you do not notice any improvement by 3 weeks, talk to your doctor. · If you have any side effects from your medicine, tell your doctor. Antidepressants can make you feel tired, dizzy, or nervous. Some people have dry mouth, constipation, headaches, sexual problems, or diarrhea. Many of these side effects are mild and will go away on their own after you have been taking the medicine for a few weeks. Some may last longer. Talk to your doctor if side effects are bothering you too much. You might be able to try a different medicine. · Get enough sleep. If you have problems sleeping: ¨ Go to bed at the same time every night, and get up at the same time every morning. ¨ Keep your bedroom dark and quiet. ¨ Do not exercise after 5:00 p.m. ¨ Avoid drinks with caffeine after 5:00 p.m. · Avoid sleeping pills unless they are prescribed by the doctor treating your depression. Sleeping pills may make you groggy during the day, and they may interact with other medicine you are taking. · If you have any other illnesses, such as diabetes, heart disease, or high blood pressure, make sure to continue with your treatment.  Tell your doctor about all of the medicines you take, including those with or without a prescription. · Keep the numbers for these national suicide hotlines: 7-634-165-TALK (5-581.138.8736) and 6-646-RRAWJIY (4-425.346.5518). If you or someone you know talks about suicide or feeling hopeless, get help right away. When should you call for help? Call 911 anytime you think you may need emergency care. For example, call if: 
  · You feel like hurting yourself or someone else.  
  · Someone you know has depression and is about to attempt or is attempting suicide.  
[facility] your doctor now or seek immediate medical care if: 
  · You hear voices.  
  · Someone you know has depression and: 
¨ Starts to give away his or her possessions. ¨ Uses illegal drugs or drinks alcohol heavily. ¨ Talks or writes about death, including writing suicide notes or talking about guns, knives, or pills. ¨ Starts to spend a lot of time alone. ¨ Acts very aggressively or suddenly appears calm.  
 Watch closely for changes in your health, and be sure to contact your doctor if: 
  · You do not get better as expected. Where can you learn more? Go to http://wendy-socrates.info/. Enter Z160 in the search box to learn more about \"Recovering From Depression: Care Instructions. \" Current as of: December 7, 2017 Content Version: 11.7 © 1199-1940 [employer]. Care instructions adapted under license by [employer] (which disclaims liability or warranty for this information). If you have questions about a medical condition or this instruction, always ask your healthcare professional. [place] [address] any warranty or liability for your use of this information. Introducing [facility] & HE[facility]! [employer] introduces [employer] patient portal. Now you can access parts of your medical record, email your doctor's office, and request medication refills online. 1. In your internet browser, go to [email or web address] Look [email or web address] 2. Click on the First Time User? Click Here link in the Sign In box. You will see the New Member Sign Up page. 3. Enter your AlterPoint Access Code exactly as it appears below. You will not need to use this code after youve completed the sign-up process. If you do not sign up before the expiration date, you must request a new code. · [employer]terPoint Access Code: [email or web address] Expires: 11/22/2018  3:01 PM 
 
4. Enter the last four digits of your Social Security Number (xxxx) and Date of Birth (mm/dd/yyyy) as indicated and click Submit. You will be taken to the next sign-up page. 5. Create a AlterPoint ID. This will be your AlterPoint login ID and cannot be changed, so think of one that is secure and easy to remember. 6. Create a AlterPoint password. You can change your password at any time. 7. Enter your Password Reset Question and Answer. This can be used at a later time if you forget your password. 8. Enter your e-mail address. You will receive e-mail notification when new information is available in [address] 9. Click Sign Up. You can now view and download portions of your medical record. 10. Click the Download Summary menu link to download a portable copy of your medical information. If you have questions, please visit the Frequently Asked Questions section of the [employer]Point website. Remember, [employer]Point is NOT to be used for urgent needs. For medical emergencies, dial 911. Now available from your iPhone and Android! Please provide this summary of care documentation to your next provider. Your primary care clinician is listed as Maureen Galeano. If you have any questions after today's visit, please call [phone]. Detail Level: Detailed Quality 137: Melanoma: Continuity Of Care - Recall System: Patient information entered into a recall system that includes: target date for the next exam specified AND a process to follow up with patients regarding missed or unscheduled appointments

## 2025-07-23 NOTE — PROGRESS NOTES
Walter Walsh 75 y.o. male presents today with   Chief Complaint   Patient presents with    Medicare AWV    Follow-Up from Hospital     7/17 COPD exacerbation   Started on home oxygen @ 2 L prn     Annual wellness  Anxiety  Presents for follow-up visit. Symptoms include irritability and nervous/anxious behavior. Patient reports no chest pain, nausea, palpitations, shortness of breath or suicidal ideas. Symptoms occur most days. The severity of symptoms is mild.         7 days ago discharge. The steroid did not help end up in [facility]. On home o2 prn.    Past Medical History:   Diagnosis Date    Abnormal drug screen 10/2024    Anxiety     Back pain     C. difficile diarrhea 2022    Chronic renal disease, stage III ([facility]) [256726] 05/10/2022    COPD (chronic obstructive pulmonary disease) ([facility])     Decrease in appetite     Hypertension     Insomnia     Lung nodules     seen [facility]    Scoliosis     injection    Smoker     Stroke ([facility])     pontine bleed     Patient Active Problem List    Diagnosis Date Noted    Lumbar arthropathy 03/13/2018    Arthropathy of lumbar facet joint 10/10/2017    DDD (degenerative disc disease), lumbosacral 10/10/2017    Lumbar spinal stenosis 10/10/2017    Chronic obstructive pulmonary disease ([facility]) 08/01/2017    Bilateral stenosis of lateral recess of lumbar spine 06/13/2016    Anxiety 10/18/2015    Cervical spondylosis with myelopathy 03/31/2015    History of CVA (cerebrovascular accident) 03/31/2015    Stroke ([facility]) 09/30/2014    Scoliosis 09/30/2014    Hypertension 09/30/2014    Hyperlipemia 09/30/2014    Pulmonary cavitary lesion 09/22/2014    Pulmonary nodule 09/22/2014    ICH (intracerebral hemorrhage) ([facility]) 09/18/2014     Past Surgical History:   Procedure Laterality Date    HERNIA REPAIR Right 2022    OTHER SURGICAL HISTORY  10/03/2016    EPIDURAL STEROID INJ DR. JESUS     Family History   Problem Relation Age of Onset    Alzheimer's Disease Mother     Heart Attack Father     No Known  Daniel Awad [address] [facility]  [address] [facility] [address] [place], [address]  [phone]             Date of visit: 8/24/18   Subjective:      History obtained from:  the patient. Suzette Lang is a 28 y.o. female who presents today for new patient to me, hasn't had pcp in a while  Originally from [facility] and is back and forth from there    Happens to be 24 weeks pregnant. G1. Has ob/gyn. Pregnancy doing well. Will be getting ultrasound and check glucola on Monday    Seizure disorder since age 9, has had seizures every few years. Last one was Jan 2017. On keppra with pregnancy, med was changed    The keppra has made her feel perez, and depressed  Had a little of this but worse since keppra    Roberto prefers keppra because the other meds were awful-depakote and others    Has some back pain low back both sides and and sciatic nerve every day    Has been doing exercises in the pool but even that hurts  Not getting better  Has tried heat or cold and taking tylenol but don't help  Had back problems before pregnancy, wwas told she has scoliosis very low  They said maybe related to a fall as a child. This year tightness in neck/upper shoulder muscles, give her headache  Due to being pregnant can only take tylenol and not helping enough    Has joint problems  Was told in [facility] would need surgery right knee for torn cartilage  Hurting more as she gains weight with pregnancy    Not getting much sleep because back hurts    As far as the mood goes, sometimes feels weepy, cries easily, sometimes feels irritable for no reason. 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     Patient Active Problem List    Diagnosis Date Noted    Seizure disorder ([facility] [address] 08/24/2018    Neck pain, chronic 08/24/2018    Chronic pain of right knee 08/24/2018    Chronic bilateral low back pain without sciatica 08/24/2018    Depression affecting pregnancy 08/24/2018    Anxiety 08/24/2018     Current Outpatient Prescriptions   Medication Sig Dispense Refill    PNV-DHA 27 mg iron-1 mg -300 mg cap TAKE 1 TABLET BY MOUTH DAILY  3    folic acid (FOLVITE) 1 mg tablet TAKE 1 TABLET BY MOUTH EVERY DAY  11    levETIRAcetam (KEPPRA) 500 mg tablet TAKE 1 TABLET BY MOUTH TWICE A DAY  11    ibuprofen (MOTRIN) 600 mg tablet Take 1 Tab by mouth every six (6) hours as needed for Pain. 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    First baby due Nov 2018        Review of Systems  Gen: denies fever  Card: denies chest pain  Skin; denies rashes  Neuro; admits to headache about every week thinks it comes from neck pain  GI denies constipation (had some but more fruit helped it)   denies vaginal bleeding, loss of fluid, contractions or loss of fetal movement  Endo; admits to weight gain from pregnancy  Psych: denies SI  ENT: denies sinus congestion  Pulm: denies shortness of breath but sometimes feels like she needs deeper breath but only since pregnant        PHQ over the last two weeks 8/24/2018   Little interest or pleasure in doing things Not at all   Feeling down, depressed, irritable, or hopeless Several days   Total Score PHQ 2 1      Objective:     Vitals:    08/24/18 1354   BP: 111/60   Pulse: 84   Resp: 18   Temp: 98 °F (36.7 °C)   TempSrc: Oral   SpO2: 98%   Weight: 157 lb 6.4 oz (71.4 kg)   Height: 5' 4\" (1.626 m)     Body mass index is 27.02 kg/(m^2).      General: stated age, well-developed, and in NAD  Eyes: PERRL, EOMI, no redness or drainage  Nose: no drainage  Mouth: no lesions  Throat: no erythema, exudate or swelling  Neck: supple, symmetrical, trachea midline, no adenopathy and thyroid: not enlarged, symmetric, no tenderness/mass/nodules  Lungs:  clear to auscultation w/o rales, rhonchi, wheezes w/normal effort and no use of accessory muscles of respiration   Heart: regular rate and rhythm, S1, S2 normal, no murmur, click, rub or gallop  Abdomen: soft, nontender, no masses other than gravid uterus  Ext:  No edema noted. Lymph: no cervical adenopathy appreciated  Skin:  Normal. and no rash or abnormalities   Neuro: normal gait, CN 2-12 intact, normal strength in lower extremities bilateally  MSK: tender bilateral neck paraspinous muscles and superior shoulder muscles (trapezius) bilaterally, no palpable spasm or tightness. Has normal ROM of neck and low back. Low back with mild tenderness of paraspinous muscles; no bony tenderness. Neg straight leg raise bilaterally. Right knee without swelling or tenderness, has normal ROM. Pain is deeper inside it seems  Psych: alert and oriented to person, place, time and situation and Speech: appropriate quality, quantity and organization of sentences       Assessment/Plan:       ICD-10-CM ICD-9-CM    1. Chronic bilateral low back pain without sciatica M54.5 724.2 REFERRAL TO PHYSICAL THERAPY    G89.29 338.29    2. Chronic pain of right knee M25.561 719.46 REFERRAL TO PHYSICAL THERAPY    G89.29 338.29    3. Neck pain, chronic M54.2 723.1 REFERRAL TO PHYSICAL THERAPY    G89.29 338.29    4. Seizure disorder ([facility] [address] G40.909 345.90    5. Depression affecting pregnancy O99.340 648.40     F32.9 311    6. Anxiety F41.9 300.00    7.  Incidental pregnancy Z33.1 V22.2         Orders Placed This Encounter    REFERRAL TO PHYSICAL THERAPY    PNV-DHA 27 mg iron-1 mg -776 mg cap    folic acid (FOLVITE) 1 mg tablet    levETIRAcetam (KEPPRA) 500 mg tablet     For the back and neck and knee, refer to PT  Will try water therapy if possible for her  Only tylenol for pain  We are obviously limited by pregnancy, but I think PT would help her more than medication anyway    Seizures controlled on keppra  Is possible her mild depression/anxiety symptoms are related to that med  However, she wants to continue it, and it is her best option in pregnancy and breastfeeding  She has plan with gyn to try breastfeeding with the keppra and is going to do a class    We could use SSRI to help, but would not recommend starting in her pregnancy unless unavoidable  Counseling more likely to help anyway at her age, and she is willing to do it  Sounds like her  is supportive, and she talks to family in [facility] via telephone. Excited about pregnancy and life is generally going well  Denies SI and contracts for safety    Discussed the diagnosis and plan and she expressed understanding. Follow-up Disposition:  Return in about 2 months (around 10/24/2018).     Julien Soni MD